# Patient Record
Sex: MALE | URBAN - METROPOLITAN AREA
[De-identification: names, ages, dates, MRNs, and addresses within clinical notes are randomized per-mention and may not be internally consistent; named-entity substitution may affect disease eponyms.]

---

## 2023-02-17 ENCOUNTER — APPOINTMENT (RX ONLY)
Dept: URBAN - METROPOLITAN AREA CLINIC 45 | Facility: CLINIC | Age: 23
Setting detail: DERMATOLOGY
End: 2023-02-17

## 2023-02-17 DIAGNOSIS — L20.89 OTHER ATOPIC DERMATITIS: ICD-10-CM | Status: INADEQUATELY CONTROLLED

## 2023-02-17 DIAGNOSIS — L21.8 OTHER SEBORRHEIC DERMATITIS: ICD-10-CM | Status: INADEQUATELY CONTROLLED

## 2023-02-17 PROBLEM — L20.84 INTRINSIC (ALLERGIC) ECZEMA: Status: ACTIVE | Noted: 2023-02-17

## 2023-02-17 PROCEDURE — 99204 OFFICE O/P NEW MOD 45 MIN: CPT

## 2023-02-17 PROCEDURE — ? ADDITIONAL NOTES

## 2023-02-17 PROCEDURE — ? MEDICATION COUNSELING

## 2023-02-17 PROCEDURE — ? PRESCRIPTION

## 2023-02-17 PROCEDURE — ? COUNSELING

## 2023-02-17 PROCEDURE — ? PRESCRIPTION MEDICATION MANAGEMENT

## 2023-02-17 RX ORDER — CLOBETASOL PROPIONATE 0.5 MG/G
CREAM TOPICAL TWICE DAILY
Qty: 60 | Refills: 1 | Status: ACTIVE

## 2023-02-17 RX ORDER — BETAMETHASONE DIPROPIONATE 0.5 MG/ML
LOTION, AUGMENTED TOPICAL
Qty: 60 | Refills: 3 | Status: ERX | COMMUNITY
Start: 2023-02-17

## 2023-02-17 RX ADMIN — BETAMETHASONE DIPROPIONATE: 0.5 LOTION, AUGMENTED TOPICAL at 00:00

## 2023-02-17 ASSESSMENT — LOCATION ZONE DERM
LOCATION ZONE: HAND
LOCATION ZONE: FACE

## 2023-02-17 ASSESSMENT — LOCATION SIMPLE DESCRIPTION DERM
LOCATION SIMPLE: RIGHT HAND
LOCATION SIMPLE: LEFT HAND
LOCATION SIMPLE: RIGHT FOREHEAD

## 2023-02-17 ASSESSMENT — LOCATION DETAILED DESCRIPTION DERM
LOCATION DETAILED: RIGHT THENAR EMINENCE
LOCATION DETAILED: LEFT THENAR EMINENCE
LOCATION DETAILED: RIGHT SUPERIOR MEDIAL FOREHEAD

## 2023-02-17 NOTE — HPI: RASH
What Type Of Note Output Would You Prefer (Optional)?: Bullet Format
Is The Patient Presenting As Previously Scheduled?: Yes
How Severe Is Your Rash?: moderate
Is This A New Presentation, Or A Follow-Up?: Rash
Additional History: New pt\\nPt presents for rash on both hands been present for 2+ years

## 2023-05-11 ENCOUNTER — APPOINTMENT (RX ONLY)
Dept: URBAN - METROPOLITAN AREA CLINIC 45 | Facility: CLINIC | Age: 23
Setting detail: DERMATOLOGY
End: 2023-05-11

## 2023-05-11 DIAGNOSIS — L21.8 OTHER SEBORRHEIC DERMATITIS: ICD-10-CM | Status: INADEQUATELY CONTROLLED

## 2023-05-11 DIAGNOSIS — L64.8 OTHER ANDROGENIC ALOPECIA: ICD-10-CM | Status: INADEQUATELY CONTROLLED

## 2023-05-11 PROCEDURE — ? MEDICATION COUNSELING

## 2023-05-11 PROCEDURE — ? ADDITIONAL NOTES

## 2023-05-11 PROCEDURE — 99214 OFFICE O/P EST MOD 30 MIN: CPT

## 2023-05-11 PROCEDURE — ? FULL BODY SKIN EXAM - DECLINED

## 2023-05-11 PROCEDURE — ? PRESCRIPTION MEDICATION MANAGEMENT

## 2023-05-11 PROCEDURE — ? COUNSELING

## 2023-05-11 PROCEDURE — ? PRESCRIPTION

## 2023-05-11 RX ORDER — KETOCONAZOLE 20 MG/ML
SHAMPOO, SUSPENSION TOPICAL AS DIRECTED
Qty: 120 | Refills: 7 | Status: ERX | COMMUNITY
Start: 2023-05-11

## 2023-05-11 RX ADMIN — KETOCONAZOLE: 20 SHAMPOO, SUSPENSION TOPICAL at 00:00

## 2023-05-11 NOTE — PROCEDURE: MEDICATION COUNSELING
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  There is also mild central narrowing at L5-S1   I have put in a referral to neurosurgery.  Please try to get him an appt as soon as possible.      Cibinqo Counseling: I discussed with the patient the risks of Cibinqo therapy including but not limited to common cold, nausea, headache, cold sores, increased blood CPK levels, dizziness, UTIs, fatigue, acne, and vomitting. Live vaccines should be avoided.  This medication has been linked to serious infections; higher rate of mortality; malignancy and lymphoproliferative disorders; major adverse cardiovascular events; thrombosis; thrombocytopenia and lymphopenia; lipid elevations; and retinal detachment.

## 2023-05-11 NOTE — HPI: OTHER
Condition:: Hair loss
Please Describe Your Condition:: Pt reported hairloss of 8 months, he mentioned it has been feeling very thin and when he showers it falls out in clumps, pt is not allergic to any medications. \\n

## 2023-05-11 NOTE — PROCEDURE: PRESCRIPTION MEDICATION MANAGEMENT
Detail Level: Zone
Initiate Treatment: ketoconazole 2 % shampoo As Directed\\nQuantity: 120.0 ml  Days Supply: 30\\nSig: Apply to scalp 3x a week prn flares.Let sit for 5 mins then rinse. Follow up with regular shampoo regimen.
Render In Strict Bullet Format?: No
Plan: Pt was also advised he can buy otc MINOXIDIL 5% FOAM for his scalp , to stop the hairloss, if not improved in 8 weeks was also advised he can do blood work to see if there is any auto immune condition.

## 2023-06-23 ENCOUNTER — APPOINTMENT (RX ONLY)
Dept: URBAN - METROPOLITAN AREA CLINIC 45 | Facility: CLINIC | Age: 23
Setting detail: DERMATOLOGY
End: 2023-06-23

## 2023-06-23 DIAGNOSIS — L64.8 OTHER ANDROGENIC ALOPECIA: ICD-10-CM | Status: INADEQUATELY CONTROLLED

## 2023-06-23 DIAGNOSIS — L20.89 OTHER ATOPIC DERMATITIS: ICD-10-CM | Status: INADEQUATELY CONTROLLED

## 2023-06-23 PROBLEM — L20.84 INTRINSIC (ALLERGIC) ECZEMA: Status: ACTIVE | Noted: 2023-06-23

## 2023-06-23 PROCEDURE — ? PRESCRIPTION MEDICATION MANAGEMENT

## 2023-06-23 PROCEDURE — 99214 OFFICE O/P EST MOD 30 MIN: CPT

## 2023-06-23 PROCEDURE — ? ADDITIONAL NOTES

## 2023-06-23 PROCEDURE — ? COUNSELING

## 2023-06-23 PROCEDURE — ? PRESCRIPTION

## 2023-06-23 PROCEDURE — ? MEDICATION COUNSELING

## 2023-06-23 PROCEDURE — ? FULL BODY SKIN EXAM - DECLINED

## 2023-06-23 RX ORDER — PHARMACY COMPOUNDING ACCESSORY
EACH MISCELLANEOUS AS DIRECTED
Qty: 30 | Refills: 5 | Status: ERX | COMMUNITY
Start: 2023-06-23

## 2023-06-23 RX ORDER — CLOBETASOL PROPIONATE 0.5 MG/G
CREAM TOPICAL TWICE DAILY
Qty: 60 | Refills: 1 | Status: ERX | COMMUNITY
Start: 2023-06-23

## 2023-06-23 RX ADMIN — Medication: at 00:00

## 2023-06-23 RX ADMIN — CLOBETASOL PROPIONATE: 0.5 CREAM TOPICAL at 00:00

## 2023-06-23 ASSESSMENT — LOCATION ZONE DERM
LOCATION ZONE: SCALP
LOCATION ZONE: HAND

## 2023-06-23 ASSESSMENT — LOCATION DETAILED DESCRIPTION DERM
LOCATION DETAILED: RIGHT THENAR EMINENCE
LOCATION DETAILED: LEFT THENAR EMINENCE
LOCATION DETAILED: LEFT LATERAL FRONTAL SCALP
LOCATION DETAILED: RIGHT CENTRAL FRONTAL SCALP
LOCATION DETAILED: LEFT SUPERIOR PARIETAL SCALP

## 2023-06-23 ASSESSMENT — LOCATION SIMPLE DESCRIPTION DERM
LOCATION SIMPLE: RIGHT HAND
LOCATION SIMPLE: SCALP
LOCATION SIMPLE: LEFT SCALP
LOCATION SIMPLE: RIGHT SCALP
LOCATION SIMPLE: LEFT HAND

## 2023-06-23 NOTE — PROCEDURE: MEDICATION COUNSELING
Azathioprine Counseling:  I discussed with the patient the risks of azathioprine including but not limited to myelosuppression, immunosuppression, hepatotoxicity, lymphoma, and infections.  The patient understands that monitoring is required including baseline LFTs, Creatinine, possible TPMP genotyping and weekly CBCs for the first month and then every 2 weeks thereafter.  The patient verbalized understanding of the proper use and possible adverse effects of azathioprine.  All of the patient's questions and concerns were addressed. resident and tech/oxygen/cardiac monitor/IV pump/IV

## 2023-06-23 NOTE — PROCEDURE: PRESCRIPTION MEDICATION MANAGEMENT
Continue Regimen: Betamethasone lotion, on scalp daily
Plan: Discussed propecia and PRP
Detail Level: Zone
Render In Strict Bullet Format?: No
Initiate Treatment: pharmacy compounding accessory As directed\\nQuantity: 30.0 g  Days Supply: 30\\nSig: Minoxidil 8% topical solution / finasteride 0.1% topical solution   Apply 3-5 drops to scalp once daily
Continue Regimen: clobetasol 0.05 % topical cream Twice daily\\nQuantity: 60.0 g  Days Supply: 30\\nSig: Apply to aa of body bid x 2 weeks then prn flares

## 2023-06-23 NOTE — PROCEDURE: ADDITIONAL NOTES
Additional Notes: Patient consent was obtained to proceed with the visit and recommended plan of care after discussion of all risks and benefits, including the risks of COVID-19 exposure.
Detail Level: Simple
Render Risk Assessment In Note?: no
Additional Notes: Patient was advised to take vitamins to prevent more hair from shedding \\n- prescriptx \\n-neutrafol \\n- viviscal

## 2023-10-23 NOTE — PROCEDURE: MEDICATION COUNSELING
2023  EMPLOYEE INFORMATION: EMPLOYER INFORMATION:   NAME: Melvin RAMIRES   : 2002 [phone]    DATE OF INJURY/EVENT: 10/19/2023           [facility]   Treating Provider: Doris Voss PA-C  Time In:  2:33 PM Time Out:  3:35 PM      DIAGNOSIS:   1. Dislocation of right patella, initial encounter      STATUS: This injury is determined to be WORK RELATED.    RETURN TO WORK:Employee may return to work with restrictions.   Return Date: 10/24/2023            RESTRICTIONS: Restrictions are to be followed at work and at home.  Restrictions are in effect until next follow-up visit.  Sit down work mostly. Allow to stand and walk as needed for transportation and stretching.  No lifting greater than 10 lbs.  Must wear straight leg knee immobilizer.       TREATMENT PLAN:   Medications for this injury/condition: For pain and inflammation:  Ibuprofen (Advil, Motrin) 1-2 tabs by mouth every 6-8 hours as needed OR Naproxen (Aleve) 1 tab by mouth twice daily as needed.   Always take with food.   For additional pain control, you may take Tylenol 1-2 tabs every 8 hours as needed.    Referral/Consult: None  Diagnostic Testing: None       Instructions: Alternate heat and ice/cold packs to painful areas 2-3 times daily for 20 minutes each.   Do not apply directly to the skin. Always use clothing or a light towel to protect the skin from injury or irritation.  Schedule right knee MRI as ordered by Ortho.      NEXT RETURN VISIT:  3 weeks    2023 @ 1130  Thank you for the privilege of providing medical care for this injury/condition.  If there are any questions, please call the occupational health clinic at Dept: [phone].      Electronically signed on 10/23/2023 at 3:33 PM by:   Doris Voss PA-C   [facility]       Minoxidil Counseling: Minoxidil is a topical medication which can increase blood flow where it is applied. It is uncertain how this medication increases hair growth. Side effects are uncommon and include stinging and allergic reactions.

## 2023-12-02 NOTE — PROCEDURE: MEDICATION COUNSELING
20500 Cimzia Counseling:  I discussed with the patient the risks of Cimzia including but not limited to immunosuppression, allergic reactions and infections.  The patient understands that monitoring is required including a PPD at baseline and must alert us or the primary physician if symptoms of infection or other concerning signs are noted.

## 2024-01-23 NOTE — PROCEDURE: MEDICATION COUNSELING
23-Jan-2024 10:06 Propranolol Counseling:  I discussed with the patient the risks of propranolol including but not limited to low heart rate, low blood pressure, low blood sugar, restlessness and increased cold sensitivity. They should call the office if they experience any of these side effects.
